# Patient Record
Sex: FEMALE | Race: WHITE | NOT HISPANIC OR LATINO | Employment: FULL TIME | URBAN - NONMETROPOLITAN AREA
[De-identification: names, ages, dates, MRNs, and addresses within clinical notes are randomized per-mention and may not be internally consistent; named-entity substitution may affect disease eponyms.]

---

## 2024-06-06 NOTE — PROGRESS NOTES
YOB: 1960  Location: [facility]  Location Address: [address] [place] [address], [facility], [address] [place] [address]  Location Phone: [phone]    Chief Complaint   Patient presents with    Facial Swelling     RIGHT FACIAL SWELLING BELOW MANDIBULAR ANGLE FIRM AND TENDER TO PALPATION. RIGHT RETROMOLAR LYMPHADENOPATHY WITH HISTORY OF RIGHT CHRONIC PAROTITIS PER DR. SANDS  -Took last dose of abx yesterday       History of Present Illness  Vanessa Candelaria is a 63 y.o. female.  Vanessa Candelaria is here for evaluation of ENT complaints. The patient has had problems with right facial swelling. She states that she has a history of parotitis. She has just completed oral antibiotics yesterday. She denies foul taste, dysphagia, hoarseness, and globus sensation.  She uses lemon water and warm compresses.     Past Medical History:   Diagnosis Date    GERD (gastroesophageal reflux disease)     Hypertension     Hypothyroidism        Past Surgical History:   Procedure Laterality Date    ADENOIDECTOMY      TONSILLECTOMY         Outpatient Medications Marked as Taking for the 24 encounter (Office Visit) with Gagan Freeman APRN   Medication Sig Dispense Refill    ascorbic acid (VITAMIN C) 250 MG tablet Take 1 tablet by mouth Daily.      atorvastatin (LIPITOR) 20 MG tablet Take 1 tablet by mouth every night at bedtime.      desvenlafaxine (PRISTIQ) 50 MG 24 hr tablet Take 1 tablet by mouth Daily.      dilTIAZem CD (CARDIZEM CD) 120 MG 24 hr capsule Take 1 capsule by mouth Daily.      doxycycline (VIBRAMYCIN) 100 MG capsule TAKE 1 CAPSULE BY MOUTH TWICE DAILY UNTIL GONE      levothyroxine (SYNTHROID, LEVOTHROID) 100 MCG tablet Take 1 tablet by mouth.      Melatonin 10 MG tablet Take 2 tablets by mouth.      pantoprazole (PROTONIX) 40 MG EC tablet Take 1 tablet by mouth Daily.      triamterene-hydrochlorothiazide (DYAZIDE) 37.5-25 MG per capsule Take 1 capsule by mouth Daily.      vitamin B-12 (cyanocobalamin) 100 MCG tablet  Take 1 tablet by mouth Daily.         Sulfa antibiotics and Wasp venom protein    Family History   Problem Relation Age of Onset    Heart disease Mother     Heart disease Father     Heart disease Brother        Social History     Socioeconomic History    Marital status:    Tobacco Use    Smoking status: Never    Smokeless tobacco: Never   Vaping Use    Vaping status: Never Used   Substance and Sexual Activity    Alcohol use: Never    Drug use: Never       Review of Systems   Constitutional: Negative.    HENT:  Positive for facial swelling.    Respiratory: Negative.         Vitals:    06/07/24 1059   BP: 129/80   Pulse: 96   Temp: 98 °F (36.7 °C)       There is no height or weight on file to calculate BMI.    Objective     Physical Exam  Vitals reviewed.   Constitutional:       Appearance: Normal appearance. She is obese.   HENT:      Head: Normocephalic.      Right Ear: Tympanic membrane, ear canal and external ear normal.      Left Ear: Tympanic membrane, ear canal and external ear normal.      Nose: Nose normal.      Mouth/Throat:      Lips: Pink.      Mouth: Mucous membranes are moist.      Pharynx: Oropharynx is clear.   Musculoskeletal:      Cervical back: Full passive range of motion without pain.   Neurological:      Mental Status: She is alert.   Psychiatric:         Behavior: Behavior is cooperative.         Assessment & Plan   Diagnoses and all orders for this visit:    1. Facial swelling (Primary)  -     CT Soft Tissue Neck With Contrast; Future      * Surgery not found *  Orders Placed This Encounter   Procedures    CT Soft Tissue Neck With Contrast     Standing Status:   Future     Standing Expiration Date:   6/7/2025     Order Specific Question:   Reason for Exam:     Answer:   parotid mass rule out     Order Specific Question:   Release to patient     Answer:   Routine Release [[id number]]     Ct neck to r/o parotid mass  Sialogogues  Warm compress and massage  Return for problems    Return in  about 4 weeks (around 2024) for Recheck, CT Dr. Donaldson.       Patient Instructions   Ct neck to r/o parotid mass  Sialogogues  Warm compress and massage  Return for problems    CONTACT INFORMATION:  The main office phone number is [phone]. For emergencies after hours and on weekends, this number will convert over to our answering service and the on call provider will answer. Please try to keep non emergent phone calls/ questions to office hours 9am-5pm Monday through Friday.      [employer]  As an alternative, you can sign up and use the Epic MyChart system for more direct and quicker access for non emergent questions/ problems.  [employer] allows you to send messages to your doctor, view your test results, renew your prescriptions, schedule appointments, and more. To sign up, go to [email or web address] and click on the Sign Up Now link in the New User? box. Enter your [employer] Activation Code exactly as it appears below along with the last four digits of your Social Security Number and your Date of Birth () to complete the sign-up process. If you do not sign up before the expiration date, you must request a new code.     [employer] Activation Code: Activation code not generated  Current [employer] Status: Active     If you have questions, you can email [email or web address] or call [phone] to talk to our [employer] staff. Remember, [employer] is NOT to be used for urgent needs. For medical emergencies, dial 911.     IF YOU SMOKE OR USE TOBACCO PLEASE READ THE FOLLOWING:  Why is smoking bad for me?  Smoking increases the risk of heart disease, lung disease, vascular disease, stroke, and cancer. If you smoke, STOP!        IF YOU SMOKE OR USE TOBACCO PLEASE READ THE FOLLOWING:  Why is smoking bad for me?  Smoking increases the risk of heart disease, lung disease, vascular disease, stroke, and cancer. If you smoke, STOP!     For more information:  Quit Now  [place]  1-800-QUIT-NOW  https://[place].quitlogix.org/en-[place]/

## 2024-06-07 ENCOUNTER — OFFICE VISIT (OUTPATIENT)
Dept: OTOLARYNGOLOGY | Facility: CLINIC | Age: 64
End: 2024-06-07
Payer: COMMERCIAL

## 2024-06-07 VITALS
TEMPERATURE: 98 F | HEART RATE: 96 BPM | WEIGHT: 162.5 LBS | SYSTOLIC BLOOD PRESSURE: 129 MMHG | DIASTOLIC BLOOD PRESSURE: 80 MMHG

## 2024-06-07 DIAGNOSIS — R22.0 FACIAL SWELLING: Primary | ICD-10-CM

## 2024-06-07 RX ORDER — ATORVASTATIN CALCIUM 20 MG/1
20 TABLET, FILM COATED ORAL
COMMUNITY

## 2024-06-07 RX ORDER — UBIDECARENONE 75 MG
100 CAPSULE ORAL DAILY
COMMUNITY

## 2024-06-07 RX ORDER — TRIAMTERENE AND HYDROCHLOROTHIAZIDE 37.5; 25 MG/1; MG/1
1 CAPSULE ORAL DAILY
COMMUNITY
Start: 2023-12-04 | End: 2024-12-04

## 2024-06-07 RX ORDER — DILTIAZEM HYDROCHLORIDE 120 MG/1
120 CAPSULE, COATED, EXTENDED RELEASE ORAL DAILY
COMMUNITY
Start: 2024-01-02 | End: 2025-01-02

## 2024-06-07 RX ORDER — PANTOPRAZOLE SODIUM 40 MG/1
40 TABLET, DELAYED RELEASE ORAL DAILY
COMMUNITY
Start: 2024-01-02 | End: 2025-01-02

## 2024-06-07 RX ORDER — DESVENLAFAXINE SUCCINATE 50 MG/1
50 TABLET, EXTENDED RELEASE ORAL DAILY
COMMUNITY
Start: 2024-01-02 | End: 2025-01-02

## 2024-06-07 RX ORDER — PHENOL 1.4 %
20 AEROSOL, SPRAY (ML) MUCOUS MEMBRANE
COMMUNITY

## 2024-06-07 RX ORDER — LEVOTHYROXINE SODIUM 0.1 MG/1
100 TABLET ORAL
COMMUNITY
Start: 2024-01-02 | End: 2025-01-02

## 2024-06-07 RX ORDER — DOXYCYCLINE HYCLATE 100 MG/1
CAPSULE ORAL
COMMUNITY
Start: 2024-04-23

## 2024-06-27 ENCOUNTER — TELEPHONE (OUTPATIENT)
Dept: OTOLARYNGOLOGY | Facility: CLINIC | Age: 64
End: 2024-06-27
Payer: COMMERCIAL

## 2024-06-27 ENCOUNTER — HOSPITAL ENCOUNTER (OUTPATIENT)
Dept: CT IMAGING | Facility: HOSPITAL | Age: 64
Discharge: HOME | End: 2024-06-27
Admitting: NURSE PRACTITIONER
Payer: COMMERCIAL

## 2024-06-27 DIAGNOSIS — R22.0 FACIAL SWELLING: ICD-10-CM

## 2024-06-27 PROCEDURE — 25510000001 IOPAMIDOL 61 % SOLUTION: Performed by: NURSE PRACTITIONER

## 2024-06-27 PROCEDURE — 70491 CT SOFT TISSUE NECK W/DYE: CPT

## 2024-06-27 PROCEDURE — 82565 ASSAY OF CREATININE: CPT

## 2024-06-27 RX ADMIN — IOPAMIDOL 100 ML: 612 INJECTION, SOLUTION INTRAVENOUS at 08:45

## 2024-06-27 NOTE — TELEPHONE ENCOUNTER
----- Message from Gagan Freeman sent at 6/27/2024 12:25 PM CDT -----  Please give patient Ct results, keep f/u with Dr. Donaldson

## 2024-06-28 LAB — CREAT BLDA-MCNC: 0.9 MG/DL (ref 0.6–1.3)

## 2024-08-19 NOTE — PROGRESS NOTES
YOB: 1960  Location: [facility]  Location Address: [address] [place] [address], [facility], [address] [place] [address]  Location Phone: [phone]    Chief Complaint   Patient presents with    Discuss CT       History of Present Illness  Vanessa Candelaria is a 63 y.o. female.  Vanessa Candelaria is here for follow up of ENT complaints. The patient has had problems with right sided submandibular swelling   Patient states symptoms have been present intermittently for the past several years   She recently completed a course of antibiotics and denies episodes of swelling or pain to the area   She denies foul taste in the mouth or dry mouth       Study Result    Narrative & Impression   EXAMINATION: CT SOFT TISSUE NECK W CONTRAST-      2024 7:23 AM     HISTORY: parotid mass rule out; R22.0-Localized swelling, mass and lump,  head     In order to have a CT radiation dose as low as reasonably achievable  Automated Exposure Control was utilized for adjustment of the mA and/or  KV according to patient size.     Total DLP = 354.07 mGy.cm     The CT scan of the soft tissue of the neck is performed after  intravenous contrast enhancement.     The images are acquired in axial plane and subsequent reconstruction in  coronal and sagittal planes.     There is no previous similar study for comparison.     A small rounded hyperdense object is seen in the lower part of the  superficial lobe of the right parotid gland measuring 5 mm in diameter.  This may represent a sialolith. I do not see any cystic changes are  ductal dilatation in the right parotid gland. Both parotid gland appears  to be of normal size and shape. Moderate increased density/enhancement  of the lower pole of the superficial lobe of the right parotid gland is  noted which may be artifactual or due to inflammatory process. There is  no surrounding subcutaneous fat infiltration.     No evidence of cervical lymphadenopathy.     Limited visualized brain appears  unremarkable.     The nasopharyngeal soft tissues are normal and symmetrical.  Parapharyngeal spaces are normal.     Oropharyngeal soft tissues are poorly visualized and evaluated due to  extensive artifact from the dental hardware. No discrete mass or  abnormal enhancement.     Normal and symmetrical submandibular salivary glands bilaterally are  noted.     Moderate lobulation of the posterior tongue with partial effacement of  the vallecula may represent lymphoid hyperplasia of the lingual tonsils.  The epiglottis, the piriform sinuses, the falls and to vocal cords are  normal.     Heterogeneously enhancing thyroid gland is seen with a probable nodule  in the right lobe?.     Normal opacification of the carotid arteries and jugular veins  bilaterally is seen.     There is moderate cervical spondylosis predominantly at C5-6 with  prominent osteophytes and narrowing of the disc spaces. Prevertebral  soft tissues appear normal.     Limited visualized lung apices are unremarkable.     IMPRESSION:  1. No discrete mass in the area of concern in the lower pole of the  right lobe of the thyroid gland. A 5 mm hyperdense object probably  represent a sialolith. No finding to suggest ductal dilatation or  obstruction. An apparent area of increased density/enhancement of the  lower pole of the right parotid gland may represent an evolving  inflammatory process?. However there is no adjacent subcutaneous fat  infiltration. No lymphadenopathy.  2. Other nonacute findings as detailed above.         This report was signed and finalized on 6/27/2024 9:14 AM by Dr. Diamond Ken MD        Past Medical History:   Diagnosis Date    GERD (gastroesophageal reflux disease)     Hypertension     Hypothyroidism        Past Surgical History:   Procedure Laterality Date    ADENOIDECTOMY      TONSILLECTOMY         Outpatient Medications Marked as Taking for the 8/20/24 encounter (Office Visit) with Mark Donaldson MD   Medication Sig  Dispense Refill    ascorbic acid (VITAMIN C) 250 MG tablet Take 1 tablet by mouth Daily.      atorvastatin (LIPITOR) 20 MG tablet Take 1 tablet by mouth every night at bedtime.      desvenlafaxine (PRISTIQ) 50 MG 24 hr tablet Take 1 tablet by mouth Daily.      dilTIAZem CD (CARDIZEM CD) 120 MG 24 hr capsule Take 1 capsule by mouth Daily.      doxycycline (VIBRAMYCIN) 100 MG capsule TAKE 1 CAPSULE BY MOUTH TWICE DAILY UNTIL GONE      levothyroxine (SYNTHROID, LEVOTHROID) 100 MCG tablet Take 1 tablet by mouth.      Melatonin 10 MG tablet Take 2 tablets by mouth.      pantoprazole (PROTONIX) 40 MG EC tablet Take 1 tablet by mouth Daily.      triamterene-hydrochlorothiazide (DYAZIDE) 37.5-25 MG per capsule Take 1 capsule by mouth Daily.      vitamin B-12 (cyanocobalamin) 100 MCG tablet Take 1 tablet by mouth Daily.         Sulfa antibiotics and Wasp venom protein    Family History   Problem Relation Age of Onset    Heart disease Mother     Heart disease Father     Heart disease Brother        Social History     Socioeconomic History    Marital status:    Tobacco Use    Smoking status: Never    Smokeless tobacco: Never   Vaping Use    Vaping status: Never Used   Substance and Sexual Activity    Alcohol use: Never    Drug use: Never       Review of Systems   Constitutional: Negative.    HENT: Negative.         Vitals:    08/20/24 1111   BP: 153/84   Pulse: 79   Temp: 97.5 °F (36.4 °C)       Body mass index is 27.04 kg/m².    Objective     Physical Exam  Vitals reviewed.   Constitutional:       Appearance: She is obese.   HENT:      Head: Normocephalic.      Right Ear: External ear normal.      Left Ear: External ear normal.      Nose: Nose normal.      Mouth/Throat:      Lips: Pink.      Comments: Right torus mandibularis   No definitive palpable salivary stone     Neurological:      Mental Status: She is alert.       Dr. Donaldson has examined and assessed the patient and agrees with current treatment plan         Assessment & Plan   Diagnoses and all orders for this visit:    1. Salivary stone (Primary)    2. Parotitis    Other orders  -     amoxicillin (AMOXIL) 500 MG capsule; Take 1 capsule by mouth 3 (Three) Times a Day for 10 days.  Dispense: 30 capsule; Refill: 0      * Surgery not found *  No orders of the defined types were placed in this encounter.    Return in about 6 months (around 2/20/2025) for Recheck.     Surgical vs conservative options discussed including parotidectomy   Recommended conservative options at this time   Warm compress, increased water intake, massage   Antibiotics if symptoms worsen   Oral sialogogues     There are no Patient Instructions on file for this visit.

## 2024-08-20 ENCOUNTER — OFFICE VISIT (OUTPATIENT)
Dept: OTOLARYNGOLOGY | Facility: CLINIC | Age: 64
End: 2024-08-20
Payer: COMMERCIAL

## 2024-08-20 VITALS
TEMPERATURE: 97.5 F | HEART RATE: 79 BPM | DIASTOLIC BLOOD PRESSURE: 84 MMHG | HEIGHT: 65 IN | SYSTOLIC BLOOD PRESSURE: 153 MMHG | WEIGHT: 160 LBS | BODY MASS INDEX: 26.66 KG/M2

## 2024-08-20 DIAGNOSIS — K11.5 SALIVARY STONE: Primary | ICD-10-CM

## 2024-08-20 DIAGNOSIS — K11.20 PAROTITIS: ICD-10-CM

## 2024-08-20 RX ORDER — AMOXICILLIN 500 MG/1
500 CAPSULE ORAL 3 TIMES DAILY
Qty: 30 CAPSULE | Refills: 0 | Status: SHIPPED | OUTPATIENT
Start: 2024-08-20 | End: 2024-08-30